# Patient Record
Sex: FEMALE | Race: AMERICAN INDIAN OR ALASKA NATIVE
[De-identification: names, ages, dates, MRNs, and addresses within clinical notes are randomized per-mention and may not be internally consistent; named-entity substitution may affect disease eponyms.]

---

## 2017-04-19 ENCOUNTER — HOSPITAL ENCOUNTER (EMERGENCY)
Dept: HOSPITAL 5 - ED | Age: 27
Discharge: HOME | End: 2017-04-19
Payer: MEDICAID

## 2017-04-19 VITALS — DIASTOLIC BLOOD PRESSURE: 87 MMHG | SYSTOLIC BLOOD PRESSURE: 133 MMHG

## 2017-04-19 DIAGNOSIS — J45.909: ICD-10-CM

## 2017-04-19 DIAGNOSIS — R51: Primary | ICD-10-CM

## 2017-04-19 DIAGNOSIS — I10: ICD-10-CM

## 2017-04-19 LAB
ANION GAP SERPL CALC-SCNC: 18 MMOL/L
BACTERIA #/AREA URNS HPF: (no result) /HPF
BASOPHILS NFR BLD AUTO: 0.6 % (ref 0–1.8)
BILIRUB UR QL STRIP: (no result)
BLOOD UR QL VISUAL: (no result)
BUN SERPL-MCNC: 18 MG/DL (ref 7–17)
BUN/CREAT SERPL: 20 %
CALCIUM SERPL-MCNC: 9.4 MG/DL (ref 8.4–10.2)
CHLORIDE SERPL-SCNC: 103.3 MMOL/L (ref 98–107)
CO2 SERPL-SCNC: 24 MMOL/L (ref 22–30)
EOSINOPHIL NFR BLD AUTO: 1 % (ref 0–4.3)
GLUCOSE SERPL-MCNC: 92 MG/DL (ref 65–100)
HCT VFR BLD CALC: 41 % (ref 30.3–42.9)
HGB BLD-MCNC: 13.4 GM/DL (ref 10.1–14.3)
KETONES UR STRIP-MCNC: (no result) MG/DL
LEUKOCYTE ESTERASE UR QL STRIP: (no result)
MCH RBC QN AUTO: 31 PG (ref 28–32)
MCHC RBC AUTO-ENTMCNC: 33 % (ref 30–34)
MCV RBC AUTO: 94 FL (ref 79–97)
MUCOUS THREADS #/AREA URNS HPF: (no result) /HPF
NITRITE UR QL STRIP: (no result)
PH UR STRIP: 6 [PH] (ref 5–7)
PLATELET # BLD: 345 K/MM3 (ref 140–440)
POTASSIUM SERPL-SCNC: 3.7 MMOL/L (ref 3.6–5)
RBC # BLD AUTO: 4.37 M/MM3 (ref 3.65–5.03)
RBC #/AREA URNS HPF: 5 /HPF (ref 0–6)
SODIUM SERPL-SCNC: 142 MMOL/L (ref 137–145)
UROBILINOGEN UR-MCNC: < 2 MG/DL (ref ?–2)
WBC # BLD AUTO: 8.8 K/MM3 (ref 4.5–11)
WBC #/AREA URNS HPF: 2 /HPF (ref 0–6)

## 2017-04-19 PROCEDURE — 36415 COLL VENOUS BLD VENIPUNCTURE: CPT

## 2017-04-19 PROCEDURE — 96374 THER/PROPH/DIAG INJ IV PUSH: CPT

## 2017-04-19 PROCEDURE — 99283 EMERGENCY DEPT VISIT LOW MDM: CPT

## 2017-04-19 PROCEDURE — 96361 HYDRATE IV INFUSION ADD-ON: CPT

## 2017-04-19 PROCEDURE — 84703 CHORIONIC GONADOTROPIN ASSAY: CPT

## 2017-04-19 PROCEDURE — 80048 BASIC METABOLIC PNL TOTAL CA: CPT

## 2017-04-19 PROCEDURE — 81001 URINALYSIS AUTO W/SCOPE: CPT

## 2017-04-19 PROCEDURE — 85025 COMPLETE CBC W/AUTO DIFF WBC: CPT

## 2017-04-19 RX ADMIN — METOCLOPRAMIDE ONE MG: 5 INJECTION, SOLUTION INTRAMUSCULAR; INTRAVENOUS at 08:03

## 2017-04-19 RX ADMIN — SODIUM CHLORIDE ONE MLS/HR: 0.9 INJECTION, SOLUTION INTRAVENOUS at 08:03

## 2017-04-19 NOTE — EMERGENCY DEPARTMENT REPORT
ED General Adult HPI





- General


Chief complaint: Weakness


Stated complaint: LT SIDE NUMBNESS


Time Seen by Provider: 17 07:40


Source: patient


Mode of arrival: Ambulatory


Limitations: No Limitations





- History of Present Illness


Initial comments: 





26-year-old female presents to the emergency department complaining of left arm 

and facial numbness.  Patient reports the onset of symptoms 3 days ago at home.

  Symptoms resolved spontaneously after approximately 15 minutes.  She called 

EMS at that time and states they told her her blood sugar was low and she 

needed to follow up with her primary care physician.  Patient attempted to get 

an appointment with her primary care doctor but was told she would not be seen 

until May 11.  The left-sided facial and arm numbness returned sometime during 

the night last night.  Patient is unable to pinpoint an exact onset.  She 

denies associated weakness.  At this time, her numbness has resolved.  She is 

complaining of headache at this time.  Headache is described as throbbing in 

nature.  It is located across her forehead and the top of her head.  She denies 

vision changes, nausea, or vomiting.  There are no other complaints.


-: Gradual, During the night


Location: head, face, left, upper extremity


Radiation: non-radiation


Severity scale (0 -10): 2


Quality: other (throbbing)


Consistency: constant


Improves with: none


Worsens with: none


Associated Symptoms: denies other symptoms


Treatments Prior to Arrival: none





- Related Data


 Previous Rx's











 Medication  Instructions  Recorded  Last Taken  Type


 


Butalb/Acetamin/Caff -40 1 each PO Q4H PRN #20 tablet 17 Unknown Rx





[Fioricet]    











 Allergies











Allergy/AdvReac Type Severity Reaction Status Date / Time


 


ibuprofen Allergy  Swelling Verified 09/10/14 12:58














ED Review of Systems


ROS: 


Stated complaint: LT SIDE NUMBNESS


Other details as noted in HPI





Comment: All other systems reviewed and negative


Neurological: headache, numbness





ED Past Medical Hx





- Past Medical History


Previous Medical History?: Yes


Hx Hypertension: Yes


Hx Asthma: Yes





- Surgical History


Past Surgical History?: Yes


Additional Surgical History: 





- Family History


Family history: no significant





- Social History


Smoking Status: Never Smoker


Substance Use Type: Alcohol





- Medications


Home Medications: 


 Home Medications











 Medication  Instructions  Recorded  Confirmed  Last Taken  Type


 


Butalb/Acetamin/Caff -40 1 each PO Q4H PRN #20 tablet 17  Unknown Rx





[Fioricet]     














ED Physical Exam





- General


Limitations: No Limitations


General appearance: alert, in no apparent distress





- Head


Head exam: Present: atraumatic, normocephalic





- Eye


Eye exam: Present: normal appearance, PERRL, EOMI





- ENT


ENT exam: Present: normal exam, normal orophraynx, mucous membranes moist





- Neck


Neck exam: Present: normal inspection, full ROM.  Absent: tenderness





- Respiratory


Respiratory exam: Present: normal lung sounds bilaterally.  Absent: respiratory 

distress





- Cardiovascular


Cardiovascular Exam: Present: regular rate, normal rhythm, normal heart sounds





- GI/Abdominal


GI/Abdominal exam: Present: soft, normal bowel sounds.  Absent: distended, 

tenderness





- Extremities Exam


Extremities exam: Present: normal inspection, full ROM.  Absent: tenderness





- Back Exam


Back exam: Present: normal inspection, full ROM.  Absent: tenderness





- Neurological Exam


Neurological exam: Present: alert, oriented X3.  Absent: motor sensory deficit





- Skin


Skin exam: Present: warm, dry, intact





ED Course


 Vital Signs











  17





  02:35 05:39 07:23


 


Temperature 98.5 F  98.1 F


 


Pulse Rate 104 H 77 79


 


Respiratory 20 22 16





Rate   


 


Blood Pressure 159/115 144/98 156/112





[Right]   


 


O2 Sat by Pulse 100 100 99





Oximetry   














  17





  09:00


 


Temperature 


 


Pulse Rate 84


 


Respiratory 16





Rate 


 


Blood Pressure 135/90





[Right] 


 


O2 Sat by Pulse 98





Oximetry 














ED Medical Decision Making





- Lab Data


Result diagrams: 


 17 03:10





 17 03:10





- Medical Decision Making





Lab results reviewed and discussed with the patient.  Patient reports headache 

has resolved with medication.  Patient will be discharged home at this time.





- Differential Diagnosis


headache, paresthesia, muscle spasm, radiculopathy


Critical care attestation.: 


If time is entered above; I have spent that time in minutes in the direct care 

of this critically ill patient, excluding procedure time.








ED Disposition


Clinical Impression: 


Headache


Qualifiers:


 Headache type: unspecified Headache chronicity pattern: acute headache 

Intractability: not intractable Qualified Code(s): R51 - Headache





Disposition: DISCHARGED TO HOME OR SELFCARE


Is pt being admited?: No


Condition: Stable


Instructions:  Acute Headache (ED)


Prescriptions: 


Butalb/Acetamin/Caff -40 [Fioricet] 1 each PO Q4H PRN #20 tablet


 PRN Reason: Headache


Referrals: 


SUSIE TAN MD [Primary Care Provider] - 3-5 Days


Time of Disposition: 09:46

## 2019-04-17 ENCOUNTER — HOSPITAL ENCOUNTER (EMERGENCY)
Dept: HOSPITAL 5 - ED | Age: 29
Discharge: HOME | End: 2019-04-17
Payer: MEDICAID

## 2019-04-17 VITALS — SYSTOLIC BLOOD PRESSURE: 140 MMHG | DIASTOLIC BLOOD PRESSURE: 91 MMHG

## 2019-04-17 DIAGNOSIS — R10.9: ICD-10-CM

## 2019-04-17 DIAGNOSIS — O26.899: Primary | ICD-10-CM

## 2019-04-17 DIAGNOSIS — Z3A.00: ICD-10-CM

## 2019-04-17 LAB
ALBUMIN SERPL-MCNC: 4 G/DL (ref 3.9–5)
ALT SERPL-CCNC: 15 UNITS/L (ref 7–56)
BASOPHILS # (AUTO): 0.1 K/MM3 (ref 0–0.1)
BASOPHILS NFR BLD AUTO: 0.7 % (ref 0–1.8)
BILIRUB DIRECT SERPL-MCNC: < 0.2 MG/DL (ref 0–0.2)
BILIRUB UR QL STRIP: (no result)
BLOOD UR QL VISUAL: (no result)
BUN SERPL-MCNC: 13 MG/DL (ref 7–17)
BUN/CREAT SERPL: 14 %
CALCIUM SERPL-MCNC: 9.3 MG/DL (ref 8.4–10.2)
EOSINOPHIL # BLD AUTO: 0.5 K/MM3 (ref 0–0.4)
EOSINOPHIL NFR BLD AUTO: 6.4 % (ref 0–4.3)
HCT VFR BLD CALC: 37.9 % (ref 30.3–42.9)
HEMOLYSIS INDEX: 10
HGB BLD-MCNC: 12.4 GM/DL (ref 10.1–14.3)
LYMPHOCYTES # BLD AUTO: 3 K/MM3 (ref 1.2–5.4)
LYMPHOCYTES NFR BLD AUTO: 37.6 % (ref 13.4–35)
MCHC RBC AUTO-ENTMCNC: 33 % (ref 30–34)
MCV RBC AUTO: 94 FL (ref 79–97)
MONOCYTES # (AUTO): 0.6 K/MM3 (ref 0–0.8)
MONOCYTES % (AUTO): 7.6 % (ref 0–7.3)
MUCOUS THREADS #/AREA URNS HPF: (no result) /HPF
PH UR STRIP: 5 [PH] (ref 5–7)
PLATELET # BLD: 349 K/MM3 (ref 140–440)
RBC # BLD AUTO: 4.03 M/MM3 (ref 3.65–5.03)
RBC #/AREA URNS HPF: 3 /HPF (ref 0–6)
UROBILINOGEN UR-MCNC: < 2 MG/DL (ref ?–2)
WBC #/AREA URNS HPF: 1 /HPF (ref 0–6)

## 2019-04-17 PROCEDURE — 84702 CHORIONIC GONADOTROPIN TEST: CPT

## 2019-04-17 PROCEDURE — 76801 OB US < 14 WKS SINGLE FETUS: CPT

## 2019-04-17 PROCEDURE — 36415 COLL VENOUS BLD VENIPUNCTURE: CPT

## 2019-04-17 PROCEDURE — 81001 URINALYSIS AUTO W/SCOPE: CPT

## 2019-04-17 PROCEDURE — 86901 BLOOD TYPING SEROLOGIC RH(D): CPT

## 2019-04-17 PROCEDURE — 80048 BASIC METABOLIC PNL TOTAL CA: CPT

## 2019-04-17 PROCEDURE — 85025 COMPLETE CBC W/AUTO DIFF WBC: CPT

## 2019-04-17 PROCEDURE — 76817 TRANSVAGINAL US OBSTETRIC: CPT

## 2019-04-17 PROCEDURE — 86850 RBC ANTIBODY SCREEN: CPT

## 2019-04-17 PROCEDURE — 86900 BLOOD TYPING SEROLOGIC ABO: CPT

## 2019-04-17 PROCEDURE — 80076 HEPATIC FUNCTION PANEL: CPT

## 2019-04-17 NOTE — EMERGENCY DEPARTMENT REPORT
ED Female  HPI





- General


Chief complaint: Vaginal Bleeding


Stated complaint: ECTOPIC PREGNANCY


Time Seen by Provider: 19 18:57


Source: patient, RN notes reviewed


Mode of arrival: Ambulatory


Limitations: No Limitations





- History of Present Illness


Initial comments: 





This is a 28-year-old female.  The patient is not known to this provider 

previously.  The patient is  3, para 1.  She thinks, but is not certain 

that her last period was on or around .





The [facility] follows [facility].  She is sent to the emergency 

room to exclude ectopic pregnancy.





The patient complains of nontraumatic right-sided, cramping abdominal pain, and 

bleeding.  The symptoms have been present since the weekend.  They're 

intermittent.  They're basically resolved at this time.  The patient denies 

headache, neck pain, chest pain, shortness of breath, irritative, obstructive 

urinary symptoms.





I contacted her covering private gynecologist, Dr. SOCRATES Mayo, who informed me 

that the patient had outpatient quantitative hCGs, of approximately 900.  The 

patient was apparently instructed to present to the aforementioned physician's 

office earlier on today, and apparently failed to do so.





The pain does not radiate anywhere, and does not have exacerbating or relieving 

factors the patient is aware of.








MD Complaint: vaginal bleeding, pelvic pain


-: Gradual, days(s)


Location: suprapubic, RLQ


Severity: moderate


Quality: cramping


Consistency: intermittent


Improves with: none


Worsens with: none


Are you Pregnant Now?: Yes


Associated Symptoms: vaginal bleeding, abdominal pain.  denies: vaginal 

discharge, nausea/vomiting, fever/chills, headaches, loss of appetite, dysuria, 

hematuria, rash, seizure, shortness of breath, syncope, weakness





- Related Data


Sexually active: Yes


                                  Previous Rx's











 Medication  Instructions  Recorded  Last Taken  Type


 


Butalb/Acetamin/Caff -40 1 each PO Q4H PRN #20 tablet 17 Unknown Rx





[Fioricet]    











                                    Allergies











Allergy/AdvReac Type Severity Reaction Status Date / Time


 


ibuprofen Allergy  Swelling Verified 09/10/14 12:58














ED Review of Systems


ROS: 


Stated complaint: ECTOPIC PREGNANCY


Other details as noted in HPI





Constitutional: denies: fever


Eyes: denies: vision change


ENT: denies: epistaxis


Respiratory: denies: cough


Cardiovascular: denies: chest pain


Gastrointestinal: abdominal pain.  denies: vomiting


Genitourinary: abnormal menses.  denies: dysuria


Musculoskeletal: denies: back pain


Skin: denies: lesions


Neurological: denies: weakness


Psychiatric: denies: anxiety





ED Past Medical Hx





- Past Medical History


Previous Medical History?: Yes


Hx Hypertension: Yes


Hx Asthma: Yes





- Surgical History


Past Surgical History?: Yes


Additional Surgical History: 





- Social History


Smoking Status: Unknown if ever smoked


Substance Use Type: None





- Medications


Home Medications: 


                                Home Medications











 Medication  Instructions  Recorded  Confirmed  Last Taken  Type


 


Butalb/Acetamin/Caff -40 1 each PO Q4H PRN #20 tablet 17  Unknown Rx





[Fioricet]     














ED Physical Exam





- General


Limitations: No Limitations


General appearance: alert, in no apparent distress





- Head


Head exam: Present: atraumatic, normocephalic





- Eye


Eye exam: Present: normal appearance, EOMI.  Absent: nystagmus





- ENT


ENT exam: Present: normal exam, normal orophraynx, mucous membranes moist, 

normal external ear exam





- Neck


Neck exam: Present: normal inspection, full ROM.  Absent: tenderness, 

meningismus





- Respiratory


Respiratory exam: Present: normal lung sounds bilaterally.  Absent: respiratory 

distress, wheezes, rales, rhonchi, stridor, chest wall tenderness





- Cardiovascular


Cardiovascular Exam: Present: regular rate, normal rhythm, normal heart sounds. 

 Absent: bradycardia, tachycardia, irregular rhythm, systolic murmur, diastolic 

murmur, rubs, gallop





- GI/Abdominal


GI/Abdominal exam: Present: soft.  Absent: distended, tenderness, guarding, 

rebound, rigid, pulsatile mass





- Extremities Exam


Extremities exam: Present: normal inspection, full ROM, other (2+ pulses noted 

in the bilateral upper, lower extremities.  Compartments soft.  No long bony 

tenderness.  The pelvis is stable.).  Absent: pedal edema, joint swelling, calf 

tenderness





- Back Exam


Back exam: Present: normal inspection, full ROM.  Absent: tenderness, CVA 

tenderness (R), paraspinal tenderness





- Neurological Exam


Neurological exam: Present: alert, other (Extraocular movements intact.  Tongue 

midline.  No facial droop.  Facial sensation intact to light touch in the V1, 

V2, V3 distribution bilaterally.  5 and 5 strength in 4 extremities..  Sensation

 is intact to light touch in 4 extremities.).  Absent: motor sensory deficit





- Psychiatric


Psychiatric exam: Present: normal affect, normal mood





- Skin


Skin exam: Present: warm, dry, intact, normal color.  Absent: rash





ED Course


                                   Vital Signs











  19





  18:59 21:48


 


Temperature 98.4 F 98.1 F


 


Pulse Rate 116 H 74


 


Respiratory 16 15





Rate  


 


Blood Pressure 152/94 


 


Blood Pressure  145/91





[Left]  


 


O2 Sat by Pulse 99 100





Oximetry  














- Reevaluation(s)


Reevaluation #1: 





19 22:54


Differential diagnosis, including but not limited to: Urinary tract infection, 

round ligament pain, ectopic pregnancy, ovarian cyst, constipation, functional 

abdominal pain





Assessment and plan: 28-year-old female with resolved right lower quadrant pain,

 nontender, afebrile, reassuring vital signs, negative Yoon sign, negative 

Rovsing sign, with an ultrasound that demonstrates a left-sided cystic thick-

walled structure, suspicious for possible luteal cyst versus ectopic gestation.





Extensive discussion had with her aforementioned covering gynecologist, Dr. Mayo.


The patient does not have any right lower quadrant tenderness at this time.  I 

do not have a high suspicion for appendicitis at this time.  Given the 

laboratory findings, and physical exam findings, and nondiagnostic ultrasound, 

the aforementioned gynecologist recommends that the patient reports to her 

office in 2 days for repeat follow-up, evaluation.  I had an extensive 

discussion with the patient regarding need for compliance for follow-up, and 

specifically counseled her that failure to follow-up may result in disability, 

paralysis, death, permanent loss of quality of life.  This is apparently a very 

highly desired pregnancy, and the aforementioned gynecologist is reluctant to 

initiate empiric methotrexate therapy at this time, given ambiguous ultrasound 

findings.





Patient verbalizes understanding to discharge instructions.





ED Medical Decision Making





- Lab Data


Result diagrams: 


                                 19 19:03





                                 19 19:03








                                   Vital Signs











  19





  18:59 21:48


 


Temperature 98.4 F 98.1 F


 


Pulse Rate 116 H 74


 


Respiratory 16 15





Rate  


 


Blood Pressure 152/94 


 


Blood Pressure  145/91





[Left]  


 


O2 Sat by Pulse 99 100





Oximetry  











                                   Lab Results











  19 Range/Units





  19:03 19:03 19:03 


 


WBC  7.9    (4.5-11.0)  K/mm3


 


RBC  4.03    (3.65-5.03)  M/mm3


 


Hgb  12.4    (10.1-14.3)  gm/dl


 


Hct  37.9    (30.3-42.9)  %


 


MCV  94    (79-97)  fl


 


MCH  31    (28-32)  pg


 


MCHC  33    (30-34)  %


 


RDW  15.1    (13.2-15.2)  %


 


Plt Count  349    (140-440)  K/mm3


 


Lymph % (Auto)  37.6 H    (13.4-35.0)  %


 


Mono % (Auto)  7.6 H    (0.0-7.3)  %


 


Eos % (Auto)  6.4 H    (0.0-4.3)  %


 


Baso % (Auto)  0.7    (0.0-1.8)  %


 


Lymph #  3.0    (1.2-5.4)  K/mm3


 


Mono #  0.6    (0.0-0.8)  K/mm3


 


Eos #  0.5 H    (0.0-0.4)  K/mm3


 


Baso #  0.1    (0.0-0.1)  K/mm3


 


Seg Neutrophils %  47.7    (40.0-70.0)  %


 


Seg Neutrophils #  3.8    (1.8-7.7)  K/mm3


 


Sodium   140   (137-145)  mmol/L


 


Potassium   3.8   (3.6-5.0)  mmol/L


 


Chloride   104.9   ()  mmol/L


 


Carbon Dioxide   24   (22-30)  mmol/L


 


Anion Gap   15   mmol/L


 


BUN   13   (7-17)  mg/dL


 


Creatinine   0.9   (0.7-1.2)  mg/dL


 


Estimated GFR   > 60   ml/min


 


BUN/Creatinine Ratio   14   %


 


Glucose   109 H   ()  mg/dL


 


Calcium   9.3   (8.4-10.2)  mg/dL


 


Total Bilirubin     (0.1-1.2)  mg/dL


 


Direct Bilirubin     (0-0.2)  mg/dL


 


Indirect Bilirubin     mg/dL


 


AST     (5-40)  units/L


 


ALT     (7-56)  units/L


 


Alkaline Phosphatase     ()  units/L


 


Total Protein     (6.3-8.2)  g/dL


 


Albumin     (3.9-5)  g/dL


 


Albumin/Globulin Ratio     %


 


HCG, Quant    816.3 H  (0-4)  mIU/mL


 


Urine Color     (Yellow)  


 


Urine Turbidity     (Clear)  


 


Urine pH     (5.0-7.0)  


 


Ur Specific Gravity     (1.003-1.030)  


 


Urine Protein     (Negative)  mg/dL


 


Urine Glucose (UA)     (Negative)  mg/dL


 


Urine Ketones     (Negative)  mg/dL


 


Urine Blood     (Negative)  


 


Urine Nitrite     (Negative)  


 


Urine Bilirubin     (Negative)  


 


Urine Urobilinogen     (<2.0)  mg/dL


 


Ur Leukocyte Esterase     (Negative)  


 


Urine WBC (Auto)     (0.0-6.0)  /HPF


 


Urine RBC (Auto)     (0.0-6.0)  /HPF


 


U Epithel Cells (Auto)     (0-13.0)  /HPF


 


Urine Mucus     /HPF


 


Blood Type     


 


Antibody Screen     














  19 Range/Units





  19:03 19:05 19:20 


 


WBC     (4.5-11.0)  K/mm3


 


RBC     (3.65-5.03)  M/mm3


 


Hgb     (10.1-14.3)  gm/dl


 


Hct     (30.3-42.9)  %


 


MCV     (79-97)  fl


 


MCH     (28-32)  pg


 


MCHC     (30-34)  %


 


RDW     (13.2-15.2)  %


 


Plt Count     (140-440)  K/mm3


 


Lymph % (Auto)     (13.4-35.0)  %


 


Mono % (Auto)     (0.0-7.3)  %


 


Eos % (Auto)     (0.0-4.3)  %


 


Baso % (Auto)     (0.0-1.8)  %


 


Lymph #     (1.2-5.4)  K/mm3


 


Mono #     (0.0-0.8)  K/mm3


 


Eos #     (0.0-0.4)  K/mm3


 


Baso #     (0.0-0.1)  K/mm3


 


Seg Neutrophils %     (40.0-70.0)  %


 


Seg Neutrophils #     (1.8-7.7)  K/mm3


 


Sodium     (137-145)  mmol/L


 


Potassium     (3.6-5.0)  mmol/L


 


Chloride     ()  mmol/L


 


Carbon Dioxide     (22-30)  mmol/L


 


Anion Gap     mmol/L


 


BUN     (7-17)  mg/dL


 


Creatinine     (0.7-1.2)  mg/dL


 


Estimated GFR     ml/min


 


BUN/Creatinine Ratio     %


 


Glucose     ()  mg/dL


 


Calcium     (8.4-10.2)  mg/dL


 


Total Bilirubin  0.20    (0.1-1.2)  mg/dL


 


Direct Bilirubin  < 0.2    (0-0.2)  mg/dL


 


Indirect Bilirubin  0.0    mg/dL


 


AST  16    (5-40)  units/L


 


ALT  15    (7-56)  units/L


 


Alkaline Phosphatase  67    ()  units/L


 


Total Protein  6.8    (6.3-8.2)  g/dL


 


Albumin  4.0    (3.9-5)  g/dL


 


Albumin/Globulin Ratio  1.4    %


 


HCG, Quant     (0-4)  mIU/mL


 


Urine Color    Yellow  (Yellow)  


 


Urine Turbidity    Clear  (Clear)  


 


Urine pH    5.0  (5.0-7.0)  


 


Ur Specific Gravity    1.034 H  (1.003-1.030)  


 


Urine Protein    30 mg/dl  (Negative)  mg/dL


 


Urine Glucose (UA)    Neg  (Negative)  mg/dL


 


Urine Ketones    Neg  (Negative)  mg/dL


 


Urine Blood    Lg  (Negative)  


 


Urine Nitrite    Neg  (Negative)  


 


Urine Bilirubin    Neg  (Negative)  


 


Urine Urobilinogen    < 2.0  (<2.0)  mg/dL


 


Ur Leukocyte Esterase    Neg  (Negative)  


 


Urine WBC (Auto)    1.0  (0.0-6.0)  /HPF


 


Urine RBC (Auto)    3.0  (0.0-6.0)  /HPF


 


U Epithel Cells (Auto)    6.0  (0-13.0)  /HPF


 


Urine Mucus    Few  /HPF


 


Blood Type   AB POSITIVE   


 


Antibody Screen   Negative   














- Radiology Data


Radiology results: report reviewed, image reviewed





Print Report











Referring Physician: KARO PATTON 


 


Patient Name: MEGAN DENG 


 


Patient ID: [id number] 


 


YOB: 1990 


 


Sex: Female 


 


Accession: [id number] 


 


Report Date: 2019 


 


Report Status: Finalized 


 


Findings


[facility] [address] [place] [address] 

Ultrasound Report Signed Patient: MEGAN DENG MR#: [id number] : 

1990 Acct:[id number] Age/Sex: 28 / F ADM Date: 19 Loc: ED 

Attending Dr: Ordering Physician: KARO PATTON NP Date of Service: 19 

Procedure(s): US OB transvaginal Accession Number(s): [id number] cc: KARO PATTON NP PROCEDURE: US OB TRANSVAGINAL HISTORY: right pelvic pain FINDINGS: 

Real-time ultrasound the pelvis was performed by transabdominal and endovaginal 

technique. The uterus measures 10.0 x 5.2 x 5.6 cm. The endometrial stripe 

measures 0.66 cm which is within normal limits. No intrauterine gestation is 

seen. The right ovary measures 2.3 x 1.8 x 2.1 cm and contains a complex cyst 

measuring 1.0 x 0.7 x 1.4 cm, without peripheral vascularity The left measures 

3.2 x 2.4 x 2.8 cm contains thick-walled cystic structure measuring 2.0 x 2.0 x 

2.0 cm, with some peripheral vascularity. This could represent luteal cyst or 

ectopic gestation. There is a thin-walled cystic structure medial to the left 

ovary 3.1 x 2.3 x 2.6 cm. No definite peripheral hypervascularity is seen, but 

given the absence of intrauterine gestation, ectopic gestation is not excluded. 

Differential diagnosis would include paraovarian cyst. Correlation with 

quantitative hCG and/or follow-up ultrasound recommended.











 IMPRESSION: No intrauterine gestation is seen Thick-walled left ovarian cystic 

lesion with peripheral vascularity, luteal cyst versus ectopic gestation Cystic 

structure medial to left ovary without peripheral vascularity. Differential 

diagnosis would include ectopic gestation or paraovarian cyst. Follow-up 

ultrasound and/or quantitative hCG are recommended. This document is 

electronically signed by Josh Owusu MD., 2019 09:31:07 PM ET 

Transcribed By: JEANNIE Dictated By: JOSH OWUSU MD Electronically 

Authenticated By: JOSH OWUSU MD Signed Date/Time: 19 9858   











Critical care attestation.: 


If time is entered above; I have spent that time in minutes in the direct care 

of this critically ill patient, excluding procedure time.








ED Disposition


Clinical Impression: 


 Abdominal pain affecting pregnancy





Disposition: DC-01 TO HOME OR SELFCARE


Is pt being admited?: No


Does the pt Need Aspirin: No


Condition: Good


Additional Instructions: 


Rest, avoid heavy lifting, strenuous physical activity, and do not have sex.





Patient needs to follow-up with her OB/GYN doctor in 2 days for repeat physical 

examination and blood test/quantitative hCG.





The patient does not follow up with her OB/GYN doctor, the patient may return to

 this emergency room for repeat physical exam, laboratory studies and 

ultrasound.  Not following up as recommended may result in an undiagnosed 

ectopic pregnancy, which can cause hemorrhagic bleeding, shock, death, paralysi

s, permanent loss of quality of life.








Therefore, it is very important that the patient follow up in 2 days as 

recommended.





Please return to the emergency room right away with new pain, worsened pain, 

migration of pain, projectile vomiting, change in mental status, confusion, 

inability to tolerate liquid feeds, bleeding more than 2 pads soaked per hour, 

lightheadedness, chest pain, shortness of breath, or loss of consciousness.





For pain, the patient may take acetaminophen over-the-counter, 650 mg, every 4-6

 hours.  Please continue current outpatient medications, and avoid consumption 

of Motrin, ibuprofen, Naprosyn, Aleve, alcoholic beverages, and tobacco, 

cannabis.








Referrals: 


[facility] [Primary Care Provider] - 3-5 Days


DAVID MAYO MD [Staff Physician] - 3-5 Days


[facility] [Provider Group] - 3-5 Days

## 2019-04-17 NOTE — ULTRASOUND REPORT
PROCEDURE: US OB TRANSVAGINAL 

 

HISTORY: right pelvic pain 

 

FINDINGS: Real-time ultrasound the pelvis was performed by transabdominal and endovaginal technique. 


 

The uterus measures 10.0 x 5.2 x 5.6 cm. The endometrial stripe measures 0.66 cm which is within norm
al limits. No intrauterine gestation is seen. 

 

The right ovary measures 2.3 x 1.8 x 2.1 cm and contains a complex cyst measuring 1.0 x 0.7 x 1.4 cm,
 without peripheral vascularity 

 

The left measures 3.2 x 2.4 x 2.8 cm contains thick-walled cystic structure measuring 2.0 x 2.0 x 2.0
 cm, with some peripheral vascularity. This could represent luteal cyst or ectopic gestation. 

 

There is a thin-walled cystic structure medial to the left ovary 3.1 x 2.3 x 2.6 cm. No definite isiah
pheral hypervascularity is seen, but given the absence of intrauterine gestation, ectopic gestation i
s not excluded. Differential diagnosis would include paraovarian cyst. Correlation with quantitative 
hCG and/or follow-up ultrasound recommended. 

 

IMPRESSION: 

No intrauterine gestation is seen 

Thick-walled left ovarian cystic lesion with peripheral vascularity, luteal cyst versus ectopic gesta
tion 

Cystic structure medial to left ovary without peripheral vascularity. Differential diagnosis would in
clude ectopic gestation or paraovarian cyst. Follow-up ultrasound and/or quantitative hCG are recomme
nded. 

 

This document is electronically signed by Josh Owusu MD., April 17 2019 09:31:07 PM ET

## 2019-04-17 NOTE — EMERGENCY DEPARTMENT REPORT
Blank Doc





- Documentation


Documentation: 





This is a 28-year-old female that presents with right pelvic pain.  Patient was 

sent by [facility] for a positive ectopic pregnancy.





This initial assessment/diagnostic orders/clinical plan/treatment(s) is/are 

subject to change based on patient's health status, clinical progression and re-

assessment by fellow clinical providers in the ED.  Further treatment and workup

at subsequent clinical providers discretion.  Patient/guardians urged not to 

elope from the ED as their condition may be serious if not clinically assessed 

and managed.  Initial orders include:


1- Patient sent to MAIN ED for further evaluation and treatment


2- labs


3- UA


4- US OB


5- Charge RN notified to have patient brought back ASAP

## 2019-04-17 NOTE — ULTRASOUND REPORT
PROCEDURE: US OB <= 14 WEEKS FETUS 

 

HISTORY: right pelvic pain 

 

FINDINGS: Real-time ultrasound the pelvis was performed by transabdominal and endovaginal technique. 


 

The uterus measures 10.0 x 5.2 x 5.6 cm. The endometrial stripe measures 0.66 cm which is within norm
al limits. No intrauterine gestation is seen. 

 

The right ovary measures 2.3 x 1.8 x 2.1 cm and contains a complex cyst measuring 1.0 x 0.7 x 1.4 cm,
 without peripheral vascularity 

 

The left measures 3.2 x 2.4 x 2.8 cm contains thick-walled cystic structure measuring 2.0 x 2.0 x 2.0
 cm, with some peripheral vascularity. This could represent luteal cyst or ectopic gestation. 

 

There is a thin-walled cystic structure medial to the left ovary 3.1 x 2.3 x 2.6 cm. No definite isiah
pheral hypervascularity is seen, but given the absence of intrauterine gestation, ectopic gestation i
s not excluded. Differential diagnosis would include paraovarian cyst. Correlation with quantitative 
hCG and/or follow-up ultrasound recommended. 

 

IMPRESSION: 

No intrauterine gestation is seen 

Thick-walled left ovarian cystic lesion with peripheral vascularity, luteal cyst versus ectopic gesta
tion 

Cystic structure medial to left ovary without peripheral vascularity. Differential diagnosis would in
clude ectopic gestation or paraovarian cyst. Follow-up ultrasound and/or quantitative hCG are recomme
nded. 

 

This document is electronically signed by Josh Owusu MD., April 17 2019 09:30:46 PM ET